# Patient Record
Sex: MALE | Race: WHITE | NOT HISPANIC OR LATINO | URBAN - METROPOLITAN AREA
[De-identification: names, ages, dates, MRNs, and addresses within clinical notes are randomized per-mention and may not be internally consistent; named-entity substitution may affect disease eponyms.]

---

## 2022-12-27 PROBLEM — I21.3 ST ELEVATION MYOCARDIAL INFARCTION (STEMI): Status: ACTIVE | Noted: 2022-12-27

## 2022-12-30 ENCOUNTER — TELEPHONE (OUTPATIENT)
Dept: CARDIOLOGY | Facility: CLINIC | Age: 62
End: 2022-12-30
Payer: COMMERCIAL

## 2022-12-30 DIAGNOSIS — R93.89 ABNORMAL ANGIOGRAM: Primary | ICD-10-CM

## 2022-12-30 DIAGNOSIS — I21.02 ST ELEVATION MYOCARDIAL INFARCTION INVOLVING LEFT ANTERIOR DESCENDING (LAD) CORONARY ARTERY: ICD-10-CM

## 2022-12-30 RX ORDER — SODIUM CHLORIDE 0.9 % (FLUSH) 0.9 %
10 SYRINGE (ML) INJECTION
Status: DISCONTINUED | OUTPATIENT
Start: 2022-12-30 | End: 2023-01-03 | Stop reason: HOSPADM

## 2022-12-30 RX ORDER — SODIUM CHLORIDE 9 MG/ML
INJECTION, SOLUTION INTRAVENOUS ONCE
Status: CANCELLED | OUTPATIENT
Start: 2022-12-30 | End: 2022-12-30

## 2022-12-30 NOTE — TELEPHONE ENCOUNTER
Angiogram    Arrive for procedure at: [facility] MON. 1/30/23 @ 8 AM.      You will receive a phone call from [facility] Pre-Op Department with further instructions prior to your scheduled procedure.    Notify the nurse if you are ALLERGIC TO IODINE.    FASTING: You MAY NOT have anything to eat or drink AFTER MIDNIGHT the day before your procedure.     MEDICATIONS: You may take your regular morning medications with water. If there are any medications that you should not take, you will be instructed to hold them for that morning.    CARDIOLOGY PRE-PROCEDURE MEDICATION ORDERS:  ** PLEASE CONTINUE ALL MEDICATIONS AS PRESCRIBED**    WHAT TO EXPECT:    How long will the procedure take?  The procedure will take an average of 1 - 2 hours to perform.  After the procedure, you will need to lay flat for around 4 - 6 hours to minimize bleeding from the puncture site. If the wrist is accessed you will need to keep your arm still as instructed by the nurse.    When can I go home?  You may be able to be discharged home that same afternoon if there were no complications.  If you have one of the following: balloon; stent; pacemaker or defibrillator procedures, you may spend one night for observation.  Your doctor will determine your discharge based upon your progress.  The results of your procedure will be discussed with you before you are discharged.  Any further testing or procedures will be scheduled for you either before you leave or you will be instructed to call for a future appointment.      TRANSPORTATION:  PLEASE ARRANGE TO HAVE SOMEONE DRIVE YOU HOME FOLLOWING YOUR PROCEDURE, YOU WILL NOT BE ALLOWED TO DRIVE.

## 2023-01-01 PROBLEM — K76.6 PORTAL HYPERTENSION: Status: ACTIVE | Noted: 2023-01-01

## 2023-01-01 PROBLEM — I21.4 NSTEMI (NON-ST ELEVATED MYOCARDIAL INFARCTION): Status: ACTIVE | Noted: 2023-01-01

## 2023-01-01 PROBLEM — I25.10 CORONARY ARTERY DISEASE: Status: ACTIVE | Noted: 2023-01-01

## 2023-01-01 PROBLEM — B20 HIV DISEASE: Status: ACTIVE | Noted: 2023-01-01

## 2023-01-01 PROBLEM — K76.6 PORTAL HYPERTENSION: Status: RESOLVED | Noted: 2023-01-01 | Resolved: 2023-01-01

## 2023-01-01 PROBLEM — I10 PRIMARY HYPERTENSION: Status: ACTIVE | Noted: 2023-01-01

## 2023-01-01 PROBLEM — N17.9 AKI (ACUTE KIDNEY INJURY): Status: ACTIVE | Noted: 2023-01-01

## 2023-01-04 ENCOUNTER — TELEPHONE (OUTPATIENT)
Dept: CARDIOLOGY | Facility: CLINIC | Age: 63
End: 2023-01-04
Payer: COMMERCIAL

## 2023-01-04 NOTE — TELEPHONE ENCOUNTER
----- Message from Nhung Guaman sent at 1/4/2023 12:26 PM CST -----  Contact: Janett  Type:  Needs Medical Advice    Who Called:  Janett with [facility] Office     Would the patient rather a call back or a response via [employer]? Call     Best Call Back Number:  [phone] ex [phone]    Additional Information: Janett with [facility] would like to speak with the nurse in regards to patient procedure on 1/30.     Please call to advise

## 2023-01-30 PROBLEM — U07.1 COVID-19: Status: ACTIVE | Noted: 2023-01-30

## 2023-01-30 PROBLEM — I21.02 ST ELEVATION MYOCARDIAL INFARCTION INVOLVING LEFT ANTERIOR DESCENDING (LAD) CORONARY ARTERY: Status: ACTIVE | Noted: 2023-01-01

## 2023-02-23 ENCOUNTER — OFFICE VISIT (OUTPATIENT)
Dept: CARDIOLOGY | Facility: CLINIC | Age: 63
End: 2023-02-23
Payer: COMMERCIAL

## 2023-02-23 VITALS
DIASTOLIC BLOOD PRESSURE: 101 MMHG | HEIGHT: 68 IN | HEART RATE: 66 BPM | BODY MASS INDEX: 22.28 KG/M2 | SYSTOLIC BLOOD PRESSURE: 154 MMHG | WEIGHT: 147 LBS

## 2023-02-23 DIAGNOSIS — I21.21: Primary | ICD-10-CM

## 2023-02-23 DIAGNOSIS — I10 PRIMARY HYPERTENSION: ICD-10-CM

## 2023-02-23 DIAGNOSIS — I25.10 CORONARY ARTERY DISEASE INVOLVING NATIVE CORONARY ARTERY OF NATIVE HEART WITHOUT ANGINA PECTORIS: ICD-10-CM

## 2023-02-23 PROCEDURE — 99214 PR OFFICE/OUTPT VISIT, EST, LEVL IV, 30-39 MIN: ICD-10-PCS | Mod: S$GLB,,, | Performed by: INTERNAL MEDICINE

## 2023-02-23 PROCEDURE — 99999 PR PBB SHADOW E&M-EST. PATIENT-LVL III: ICD-10-PCS | Mod: PBBFAC,,, | Performed by: INTERNAL MEDICINE

## 2023-02-23 PROCEDURE — 99999 PR PBB SHADOW E&M-EST. PATIENT-LVL III: CPT | Mod: PBBFAC,,, | Performed by: INTERNAL MEDICINE

## 2023-02-23 PROCEDURE — 99214 OFFICE O/P EST MOD 30 MIN: CPT | Mod: S$GLB,,, | Performed by: INTERNAL MEDICINE

## 2023-02-23 NOTE — PROGRESS NOTES
Cardiology Clinic Note      Patient: Angel Alejandro, 1960, [id number]  Primary Care Provider: Primary Doctor No     Chief Complaint/Reason for Referral: CAD     Subjective:       Angel Alejandro is a 62 y.o. male who presents for follow up. They are accompanied by alf guards.    Doing well. Denies CP, SOB, pathologic bleeding, edema, orthopnea, palpitations.     Focused Past History includes:  Delayed presentation with posterior MI 12/27/2022. unsuccessful primary PCI of the culprit OM2 complicated by extensive dissection of the circumflex, OM2, OM3 with LINDA 1-2 flow at the end of the case. The LVEF was preserved by TTE  Relook coronary angiogram 1/30/23:  Normal EDP  ?          In stent occlusion of the mid circumflex  ?          Successful IVUS guided PCI of the LAD from proximal to mid with overlapping 3.5 x 32 and 2.25 x 38 Synergy XD GABBY   ?          Successful PCI of the RCA from ostial to mid with overlapping 3.5 x 16 and 2.5 x 48 Synergy XD GABBY   Long-term alf inmate     Review of Systems  Constitutional: negative for fevers, night sweats, and weight loss  Eyes: negative for visual disturbance, diplopia  Respiratory: negative for cough, hemoptysis, sputum, and wheezing  Cardiovascular: see HPI  Gastrointestinal: negative for abdominal pain, bright red blood per rectum, change in bowel habits, dysphagia, melena, and reflux symptoms  Genitourinary:negative for dysuria, frequency, and hematuria  Hematologic/lymphatic: negative for bleeding, easy bruising, and lymphadenopathy  Musculoskeletal:negative for arthralgias, back pain, and myalgias  Neurological: negative for gait problems, paresthesia, speech problems, vertigo, and weakness  Behavioral/Psych: negative for excessive alcohol consumption, illegal drug usage, and sleep disturbance    ----------------------------  AIDS  Anticoagulant long-term use  Essential (primary) hypertension  Heart attack  Human immunodeficiency virus (HIV)  disease  ----------------------------  Angiogram, coronary, with left heart catheterization      Comment:  Procedure: ANGIOGRAM,CORONARY,WITH LEFT HEART                CATHETERIZATION;  Surgeon: Katie Sims MD;  Location: [facility]               CATH;  Service: Cardiology;  Laterality: N/A;  Coronary angiography      Comment:  Procedure: ANGIOGRAM, CORONARY ARTERY;  Surgeon: Katie Sims MD;  Location: [facility] CATH;  Service: Cardiology;;  Ivus, coronary      Comment:  Procedure: IVUS, Coronary;  Surgeon: Katie Sims MD;                 Location: [facility] CATH;  Service: Cardiology;;  Left heart catheterization      Comment:  Procedure: Left heart cath;  Surgeon: Katie Sims MD;                 Location: [facility] CATH;  Service: Cardiology;;  Percutaneous coronary intervention, artery      Comment:  Procedure: Percutaneous coronary intervention;  Surgeon:               Katie Sims MD;  Location: [facility] CATH;  Service:                Cardiology;  Laterality: N/A;  Percutaneous coronary intervention, artery      Comment:  Procedure: Percutaneous coronary intervention;  Surgeon:               Katie Sims MD;  Location: [facility] CATH;  Service:                Cardiology;;     No family history on file.  Social History     Tobacco Use    Smoking status: Unknown   Substance Use Topics    Alcohol use: Not Currently    Drug use: Not Currently       Current Outpatient Medications   Medication Sig Dispense Refill    aspirin (ECOTRIN) 81 MG EC tablet Take 1 tablet (81 mg total) by mouth once daily. 360 tablet 0    atorvastatin (LIPITOR) 80 MG tablet Take 1 tablet (80 mg total) by mouth every evening. 30 tablet 0    DESCOVY 200-25 mg Tab Take 1 tablet by mouth once daily.      lisinopriL (PRINIVIL,ZESTRIL) 20 MG tablet Take 40 mg by mouth once daily.      metoprolol succinate (TOPROL-XL) 50 MG 24 hr tablet Take 1 tablet (50 mg total) by mouth once daily. 30 tablet 11    nitroGLYCERIN (NITROSTAT) 0.4 MG SL tablet Place 1 tablet (0.4 mg total)  "under the tongue every 5 (five) minutes as needed for Chest pain. 30 tablet 3    ticagrelor (BRILINTA) 90 mg tablet Take 1 tablet (90 mg total) by mouth 2 (two) times daily. 60 tablet 0    TIVICAY 50 mg Tab Take 1 tablet by mouth once daily.       No current facility-administered medications for this visit.        Objective:      Physical Exam  BP (!) 154/101 (BP Location: Right arm, Patient Position: Sitting, BP Method: Medium (Automatic))   Pulse 66   Ht 5' 8" (1.727 m)   Wt 66.7 kg (147 lb)   BMI 22.35 kg/m²   Body surface area is 1.79 meters squared.  Body mass index is 22.35 kg/m².    General appearance: alert, appears stated age, cooperative, and no distress  Head: Normocephalic, without obvious abnormality, atraumatic  Neck: no carotid bruit, no JVD, and supple, symmetrical, trachea midline  Lungs: clear to auscultation bilaterally  Heart: regular rate and rhythm; S1, S2 normal, no murmur, click, rub or gallop  Abdomen: soft, non-tender, no distended  Extremities: extremities atraumatic, no pitting edema  Skin: warm, no cyanosis, no pathologic ecchymosis in exposed portions  Neurologic: Grossly normal. A&O x3      Lab Review   Lab Results   Component Value Date    WBC 10.60 01/31/2023    HGB 11.7 (L) 01/31/2023    HCT 34.0 (L) 01/31/2023    MCV 99 (H) 01/31/2023     01/31/2023         BMP  Lab Results   Component Value Date     01/31/2023    K 3.8 01/31/2023     01/31/2023    CO2 24 01/31/2023    BUN 11 01/31/2023    CREATININE 1.15 01/31/2023    CALCIUM 8.8 01/31/2023    ANIONGAP 5 (L) 01/31/2023       Lab Results   Component Value Date    ALBUMIN 3.5 01/01/2023       Lab Results   Component Value Date    ALT 35 01/01/2023    AST 64 (H) 01/01/2023    ALKPHOS 71 01/01/2023    BILITOT 1.0 01/01/2023       No results found for: TSH    Lab Results   Component Value Date    CHOL 187 04/07/2021     Lab Results   Component Value Date    HDL 42 04/07/2021     Lab Results   Component Value Date "    LDLCALC 113 04/07/2021     Lab Results   Component Value Date    TRIG 162 (H) 04/07/2021     Lab Results   Component Value Date    CHOLHDL 4.45 04/07/2021        Assessment & Plan:      This is a 62 y.o.  male. Recovering well after MI and multivessel PCI. He has no symptoms today. We discussed secondary prevention meds.      1. ST elevation myocardial infarction (STEMI) involving left circumflex coronary artery in recovery phase        2. Coronary artery disease involving native coronary artery of native heart without angina pectoris        3. Primary hypertension             Continue aspirin, ticagrelor for at least 12 months  Increase lisinopril to 40 mg once daily  Continue metop succinate 50 mg daily  Continue atorva 80    Emphasized the importance of modifying lifestyle related risk factors including smoking cessation, limiting alcohol intake, exercise, diet most resembling a Mediterranean diet.    I appreciate the opportunity to participate in Angel Alejandro 's care today.  Please follow up with me in 12 months with EKG.      Katie Sims MD, [facility]  Interventional Cardiology/Structural Heart Disease  [facility]  Office: [phone]     Parts of this note were completed using voice recognition software. Please excuse any misspellings or syntax errors and reach out to me with questions.

## 2023-04-03 PROBLEM — I21.3 ST ELEVATION MYOCARDIAL INFARCTION (STEMI): Status: RESOLVED | Noted: 2022-12-27 | Resolved: 2023-04-03

## 2023-04-10 PROBLEM — N17.9 AKI (ACUTE KIDNEY INJURY): Status: RESOLVED | Noted: 2023-01-01 | Resolved: 2023-04-10

## 2023-05-08 PROBLEM — I21.02 ST ELEVATION MYOCARDIAL INFARCTION INVOLVING LEFT ANTERIOR DESCENDING (LAD) CORONARY ARTERY: Status: RESOLVED | Noted: 2023-01-01 | Resolved: 2023-05-08

## 2024-07-22 ENCOUNTER — TELEPHONE (OUTPATIENT)
Dept: CARDIOLOGY | Facility: CLINIC | Age: 64
End: 2024-07-22
Payer: MEDICAID